# Patient Record
(demographics unavailable — no encounter records)

---

## 2025-07-28 NOTE — PHYSICAL EXAM
[General Appearance - Alert] : alert [General Appearance - In No Acute Distress] : in no acute distress [General Appearance - Well Nourished] : well nourished [General Appearance - Well Developed] : well developed [General Appearance - Well-Appearing] : healthy appearing [Sclera] : the sclera and conjunctiva were normal [Examination Of The Oral Cavity] : the lips and gums were normal [Respiration, Rhythm And Depth] : normal respiratory rhythm and effort [Exaggerated Use Of Accessory Muscles For Inspiration] : no accessory muscle use [Heart Rate And Rhythm] : heart rate was normal and rhythm regular [Heart Sounds] : normal S1 and S2 [Edema] : there was no peripheral edema [Abnormal Walk] : normal gait [] : no rash [Oriented To Time, Place, And Person] : oriented to person, place, and time [Impaired Insight] : insight and judgment were intact [Affect] : the affect was normal [Mood] : the mood was normal [FreeTextEntry1] : right MCP enlargement with subluxation which is reducible, more on the right than on the left. No PIP or DIP tenderness.  Right first toe with enlargement of the MTP and bunion with crowding of the MPs right more than left, no tenderness over the MTP joints

## 2025-07-28 NOTE — DATA REVIEWED
[FreeTextEntry1] : April 2025 hand x-rays interpreted by orthopedist Imaging: PA, lateral and oblique X-Rays of bilateral wrists and hands were obtained today to assess for bony injury, masses or lesions. Diffuse arthritic changes are noted at the radiocarpal and midcarpal joints, bilateral thumb CMC joints, thumb, index and long finger MCP joints as well as all IP joints in all digits.

## 2025-07-28 NOTE — HISTORY OF PRESENT ILLNESS
[FreeTextEntry1] : July 28, 2025 80-year-old woman referred for rheumatology evaluation Patient is here with her daughter Patient reports right hand pain Initially evaluated by hand orthopedist, status post steroid injection for right index finger trigger finger with excellent response No longer sticking or triggering. Also completed occupational therapy for the hands bilaterally felt great improvement with therapy Continues home exercises  right hand, more than left hand  Since that time, pain has improved, now feels ok If uses the hands a lot, feels increased pain Morning stiffness of the right hand about 15 to 20-minute MCP swelling of the right hand Right MTP foot  +morning stiffness about 15 minutes Takes Tylenol as needed, cannot tolerate Tylenol arthritis, cannot tolerate NSAIDs No numbness or tingling of the hands or feet, no neck pain  Takes amlodipine 5 mg daily No other medications  No DEXA previously  Right wrist fracture, s/p fall about 10 years ago No treatments for osteoporosis in the past Takes tylenol for pain, but rarely If takes, usually for the hands Was recently evaluated by podiatrist, status post steroid injection in the first MTP X-rays of the feet not available to me at this time  Wears compression gloves, helps   Worked for many years as a  for [employer] now retired No family history of arthritis Patient is originally from [facility]

## 2025-07-28 NOTE — REVIEW OF SYSTEMS
[Arthralgias] : arthralgias [Joint Pain] : joint pain [Joint Stiffness] : joint stiffness [Negative] : Heme/Lymph [Skin Lesions] : no skin lesions [Limb Weakness] : no limb weakness [Difficulty Walking] : no difficulty walking [FreeTextEntry9] : hands and feet [de-identified] : No psoriasis

## 2025-07-28 NOTE — ASSESSMENT
[FreeTextEntry1] : 80-year-old woman referred for rheumatology evaluation.  Patient with bilateral hand pain and stiffness, right more than left in addition to bilateral foot pain and stiffness right more than left.  Patient previously evaluated by orthopedist, status post steroid injection for right second digit trigger finger with great improvement in joint stiffness following injection.  Patient reports flares of joint symptoms, over the past few years, now feeling better although still with MCP involvement and difficulty picking things up with fingers.  Patient felt benefit with occupational therapy, also takes acetaminophen as needed, in addition to compression gloves which help with the pain.  Hand x-rays completed by orthopedist reviewed with patient.  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  Patient had previous x-rays of the feet with the podiatrist, will forward records, also with benefit from steroid injection to the first toe.  Further management pending results, follow-up in 2 weeks via telehealth to review, management pending results.